# Patient Record
Sex: MALE | Race: ASIAN | URBAN - METROPOLITAN AREA
[De-identification: names, ages, dates, MRNs, and addresses within clinical notes are randomized per-mention and may not be internally consistent; named-entity substitution may affect disease eponyms.]

---

## 2020-07-25 ENCOUNTER — TELEPHONE ENCOUNTER (OUTPATIENT)
Dept: URBAN - METROPOLITAN AREA CLINIC 13 | Facility: CLINIC | Age: 50
End: 2020-07-25

## 2020-07-25 RX ORDER — SODIUM SULFATE, POTASSIUM SULFATE, MAGNESIUM SULFATE 17.5; 3.13; 1.6 G/ML; G/ML; G/ML
DILUTE CONTENTS AND USE AS DIRECTED FOR BOWEL PREP SOLUTION, CONCENTRATE ORAL
Qty: 1 | Refills: 0 | OUTPATIENT
Start: 2019-05-03 | End: 2019-07-16

## 2020-07-26 ENCOUNTER — TELEPHONE ENCOUNTER (OUTPATIENT)
Dept: URBAN - METROPOLITAN AREA CLINIC 13 | Facility: CLINIC | Age: 50
End: 2020-07-26

## 2021-07-15 ENCOUNTER — WEB ENCOUNTER (OUTPATIENT)
Dept: URBAN - METROPOLITAN AREA CLINIC 113 | Facility: CLINIC | Age: 51
End: 2021-07-15

## 2021-07-15 ENCOUNTER — OFFICE VISIT (OUTPATIENT)
Dept: URBAN - METROPOLITAN AREA CLINIC 113 | Facility: CLINIC | Age: 51
End: 2021-07-15
Payer: SELF-PAY

## 2021-07-15 VITALS
HEART RATE: 88 BPM | SYSTOLIC BLOOD PRESSURE: 131 MMHG | TEMPERATURE: 97.8 F | BODY MASS INDEX: 21.22 KG/M2 | DIASTOLIC BLOOD PRESSURE: 75 MMHG | HEIGHT: 68 IN | WEIGHT: 140 LBS

## 2021-07-15 DIAGNOSIS — D36.9 TUBULAR ADENOMA: ICD-10-CM

## 2021-07-15 DIAGNOSIS — R10.84 GENERALIZED ABDOMINAL PAIN: ICD-10-CM

## 2021-07-15 DIAGNOSIS — D18.03 HEMANGIOMA OF INTRA-ABDOMINAL STRUCTURE: ICD-10-CM

## 2021-07-15 PROCEDURE — 99213 OFFICE O/P EST LOW 20 MIN: CPT | Performed by: INTERNAL MEDICINE

## 2021-07-15 NOTE — HPI-TODAY'S VISIT:
51-year-old male presenting for follow-up.  He was last seen in 2019 as a new patient evaluation of abdominal pain.  At that time he did have a CT scan performed with and without contrast which demonstrated excess mineral deposition most commonly seen with amiodarone or hemachromatosis use. He also had a liver hemangioma and benign cysts. We did recommend iron studies, a colonoscopy, and follow-up.  His colonoscopy was ultimately performed and he was found to have a tubular adenoma.  His next scheduled colonoscopy will be in July 2022.  He has been having intermittent abdominal pain. Rudy Mendiola MD  [facility]  Office [phone]  Ans Serv [phone] cell [phone]

## 2021-08-02 ENCOUNTER — OFFICE VISIT (OUTPATIENT)
Dept: URBAN - METROPOLITAN AREA CLINIC 113 | Facility: CLINIC | Age: 51
End: 2021-08-02

## 2021-09-13 ENCOUNTER — OFFICE VISIT (OUTPATIENT)
Dept: URBAN - METROPOLITAN AREA CLINIC 113 | Facility: CLINIC | Age: 51
End: 2021-09-13
Payer: SELF-PAY

## 2021-09-13 VITALS
WEIGHT: 143 LBS | BODY MASS INDEX: 21.67 KG/M2 | DIASTOLIC BLOOD PRESSURE: 85 MMHG | HEIGHT: 68 IN | RESPIRATION RATE: 18 BRPM | TEMPERATURE: 97.1 F | HEART RATE: 71 BPM | SYSTOLIC BLOOD PRESSURE: 133 MMHG

## 2021-09-13 DIAGNOSIS — D36.9 TUBULAR ADENOMA: ICD-10-CM

## 2021-09-13 DIAGNOSIS — R10.84 GENERALIZED ABDOMINAL PAIN: ICD-10-CM

## 2021-09-13 DIAGNOSIS — D18.03 HEMANGIOMA OF INTRA-ABDOMINAL STRUCTURE: ICD-10-CM

## 2021-09-13 PROBLEM — 189197001: Status: ACTIVE | Noted: 2021-07-15

## 2021-09-13 PROBLEM — 444408007: Status: ACTIVE | Noted: 2021-07-15

## 2021-09-13 PROBLEM — 102614006: Status: ACTIVE | Noted: 2021-07-15

## 2021-09-13 PROCEDURE — 99214 OFFICE O/P EST MOD 30 MIN: CPT | Performed by: INTERNAL MEDICINE

## 2021-09-13 NOTE — HPI-TODAY'S VISIT:
51-year-old male presenting for follow-up.  He was last seen in the clinic on July 15, 2021. He was found to have a liver lesion on CT abdomen and pelvis in May 2021  as well.  We recommended an MRI which was performed on 7/23/2021.  This did demonstrate a hepatic hemangioma in segment 7 of the liver.  He did measure 2.1 cm.  This lesion did date back to previous studies in 2019 and was a similar size.  There was no concerning features of this lesion.  He is still having intermittent gas and bloating after certain meals. He tries to avoid certain foods but he likes spicy foods and wine. He denies any other GI symptoms.   7/15/21 51-year-old male presenting for follow-up.  He was last seen in 2019 as a new patient evaluation of abdominal pain.  At that time he did have a CT scan performed with and without contrast which demonstrated excess mineral deposition most commonly seen with amiodarone or hemachromatosis use. He also had a liver hemangioma and benign cysts. We did recommend iron studies, a colonoscopy, and follow-up.  His colonoscopy was ultimately performed and he was found to have a tubular adenoma.  His next scheduled colonoscopy will be in July 2022.  He has been having intermittent abdominal pain.

## 2023-05-24 ENCOUNTER — DASHBOARD ENCOUNTERS (OUTPATIENT)
Age: 53
End: 2023-05-24

## 2023-05-24 ENCOUNTER — WEB ENCOUNTER (OUTPATIENT)
Dept: URBAN - METROPOLITAN AREA CLINIC 113 | Facility: CLINIC | Age: 53
End: 2023-05-24

## 2023-05-24 ENCOUNTER — OFFICE VISIT (OUTPATIENT)
Dept: URBAN - METROPOLITAN AREA CLINIC 113 | Facility: CLINIC | Age: 53
End: 2023-05-24
Payer: SELF-PAY

## 2023-05-24 VITALS
TEMPERATURE: 97.3 F | BODY MASS INDEX: 21.46 KG/M2 | RESPIRATION RATE: 18 BRPM | HEART RATE: 73 BPM | DIASTOLIC BLOOD PRESSURE: 89 MMHG | WEIGHT: 141.6 LBS | SYSTOLIC BLOOD PRESSURE: 135 MMHG | HEIGHT: 68 IN

## 2023-05-24 DIAGNOSIS — D36.9 TUBULAR ADENOMA: ICD-10-CM

## 2023-05-24 DIAGNOSIS — D18.03 HEMANGIOMA OF INTRA-ABDOMINAL STRUCTURE: ICD-10-CM

## 2023-05-24 DIAGNOSIS — R10.84 GENERALIZED ABDOMINAL PAIN: ICD-10-CM

## 2023-05-24 PROCEDURE — 99214 OFFICE O/P EST MOD 30 MIN: CPT | Performed by: INTERNAL MEDICINE

## 2023-05-24 RX ORDER — SODIUM, POTASSIUM,MAG SULFATES 17.5-3.13G
177ML SOLUTION, RECONSTITUTED, ORAL ORAL
Qty: 1 | OUTPATIENT
Start: 2023-05-24 | End: 2023-05-26

## 2023-05-24 NOTE — HPI-TODAY'S VISIT:
53 y/o male presenting for f/u. He was last seen in September 2021. He last had a CSC in 2019 and was found to have three pedunculated polpys removed. He is due for a repeat CSC.  Patient is without any abdominal complaints. No dysphagia, heartburn, regurgitation, unintentional weight loss, nausea, vomiting, hematemesis or melena. Bowels are moving regularly without blood per rectum. No complaints of bloating. No jaundice, icterus.  9/13/21 51-year-old male presenting for follow-up.  He was last seen in the clinic on July 15, 2021. He was found to have a liver lesion on CT abdomen and pelvis in May 2021  as well.  We recommended an MRI which was performed on 7/23/2021.  This did demonstrate a hepatic hemangioma in segment 7 of the liver.  He did measure 2.1 cm.  This lesion did date back to previous studies in 2019 and was a similar size.  There was no concerning features of this lesion.  He is still having intermittent gas and bloating after certain meals. He tries to avoid certain foods but he likes spicy foods and wine. He denies any other GI symptoms.   7/15/21 51-year-old male presenting for follow-up.  He was last seen in 2019 as a new patient evaluation of abdominal pain.  At that time he did have a CT scan performed with and without contrast which demonstrated excess mineral deposition most commonly seen with amiodarone or hemachromatosis use. He also had a liver hemangioma and benign cysts. We did recommend iron studies, a colonoscopy, and follow-up.  His colonoscopy was ultimately performed and he was found to have a tubular adenoma.  His next scheduled colonoscopy will be in July 2022.  He has been having intermittent abdominal pain.

## 2023-07-18 ENCOUNTER — CLAIMS CREATED FROM THE CLAIM WINDOW (OUTPATIENT)
Dept: URBAN - METROPOLITAN AREA SURGERY CENTER 25 | Facility: SURGERY CENTER | Age: 53
End: 2023-07-18

## 2023-07-18 ENCOUNTER — OUT OF OFFICE VISIT (OUTPATIENT)
Dept: URBAN - METROPOLITAN AREA SURGERY CENTER 25 | Facility: SURGERY CENTER | Age: 53
End: 2023-07-18
Payer: SELF-PAY

## 2023-07-18 DIAGNOSIS — Z12.11 COLON CANCER SCREENING (HIGH RISK): ICD-10-CM

## 2023-07-18 DIAGNOSIS — Z86.010 ADENOMAS PERSONAL HISTORY OF COLONIC POLYPS: ICD-10-CM

## 2023-07-18 DIAGNOSIS — K64.1 SECOND DEGREE HEMORRHOIDS: ICD-10-CM

## 2023-07-18 PROCEDURE — G8907 PT DOC NO EVENTS ON DISCHARG: HCPCS | Performed by: INTERNAL MEDICINE

## 2023-07-18 PROCEDURE — 00811 ANES LWR INTST NDSC NOS: CPT | Performed by: ANESTHESIOLOGY

## 2023-07-18 PROCEDURE — 00811 ANES LWR INTST NDSC NOS: CPT | Performed by: NURSE ANESTHETIST, CERTIFIED REGISTERED

## 2023-07-18 PROCEDURE — G0105 COLORECTAL SCRN; HI RISK IND: HCPCS | Performed by: INTERNAL MEDICINE

## 2023-08-01 ENCOUNTER — OFFICE VISIT (OUTPATIENT)
Dept: URBAN - METROPOLITAN AREA CLINIC 113 | Facility: CLINIC | Age: 53
End: 2023-08-01